# Patient Record
Sex: FEMALE | URBAN - METROPOLITAN AREA
[De-identification: names, ages, dates, MRNs, and addresses within clinical notes are randomized per-mention and may not be internally consistent; named-entity substitution may affect disease eponyms.]

---

## 2019-03-11 ENCOUNTER — IMPORTED ENCOUNTER (OUTPATIENT)
Dept: URBAN - METROPOLITAN AREA CLINIC 1 | Facility: CLINIC | Age: 24
End: 2019-03-11

## 2019-03-11 PROBLEM — H52.13: Noted: 2019-03-11

## 2019-03-11 PROCEDURE — 92004 COMPRE OPH EXAM NEW PT 1/>: CPT

## 2019-03-11 PROCEDURE — 92015 DETERMINE REFRACTIVE STATE: CPT

## 2019-03-11 NOTE — PATIENT DISCUSSION
1. Myopia-- Rx was given for correction if indicated and requested. Return for an appointment in 1 year for a 36 with Dr. Elfego Su.

## 2022-04-02 ASSESSMENT — VISUAL ACUITY
OS_SC: 20/25
OS_CC: J1
OD_SC: 20/20
OD_CC: J1

## 2022-04-02 ASSESSMENT — TONOMETRY
OS_IOP_MMHG: 17
OD_IOP_MMHG: 17

## 2022-12-30 ENCOUNTER — NEW PATIENT (OUTPATIENT)
Dept: URBAN - METROPOLITAN AREA CLINIC 1 | Facility: CLINIC | Age: 27
End: 2022-12-30

## 2022-12-30 PROCEDURE — 92004 COMPRE OPH EXAM NEW PT 1/>: CPT

## 2022-12-30 PROCEDURE — 92015 DETERMINE REFRACTIVE STATE: CPT

## 2022-12-30 ASSESSMENT — TONOMETRY
OS_IOP_MMHG: 17
OD_IOP_MMHG: 17

## 2022-12-30 ASSESSMENT — VISUAL ACUITY
OS_CC: 20/30
OD_CC: J1+
OD_CC: 20/20
OS_CC: J1+

## 2022-12-30 ASSESSMENT — KERATOMETRY
OD_K1POWER_DIOPTERS: 41.25
OS_AXISANGLE2_DEGREES: 88
OS_K1POWER_DIOPTERS: 41.50
OD_AXISANGLE2_DEGREES: 90
OS_K2POWER_DIOPTERS: 44.00
OS_AXISANGLE_DEGREES: 178
OD_K2POWER_DIOPTERS: 43.50
OD_AXISANGLE_DEGREES: 180

## 2022-12-30 NOTE — PATIENT DISCUSSION
Advised to call immediately if eye pain or loss of vision.
Patient advised to call if any problems, questions, or concerns.
Patient given Rx for glasses.
Patient made aware of 24/7 emergency services.
Patient understands condition, prognosis and need for follow up care.
Recommend ATs TID OU, routinely.
[employer] dose 1, 2, and 3